# Patient Record
Sex: FEMALE
[De-identification: names, ages, dates, MRNs, and addresses within clinical notes are randomized per-mention and may not be internally consistent; named-entity substitution may affect disease eponyms.]

---
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
## 2025-04-21 NOTE — PROCEDURE: MIPS QUALITY
PAST MEDICAL HISTORY:  CAD (coronary artery disease)     Hiatal hernia     History of colitis     HLD (hyperlipidemia)     HTN (hypertension)     Paroxysmal atrial fibrillation     
Quality 226: Preventive Care And Screening: Tobacco Use: Screening And Cessation Intervention: Patient screened for tobacco use and is an ex/non-smoker
Detail Level: Detailed